# Patient Record
Sex: FEMALE | Race: WHITE | Employment: STUDENT | URBAN - NONMETROPOLITAN AREA
[De-identification: names, ages, dates, MRNs, and addresses within clinical notes are randomized per-mention and may not be internally consistent; named-entity substitution may affect disease eponyms.]

---
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
## 2024-02-14 ENCOUNTER — HOSPITAL ENCOUNTER (EMERGENCY)
Age: 19
Discharge: HOME | End: 2024-02-14
Payer: COMMERCIAL

## 2024-02-14 VITALS
WEIGHT: 156 LBS | RESPIRATION RATE: 17 BRPM | OXYGEN SATURATION: 100 % | BODY MASS INDEX: 23.72 KG/M2 | SYSTOLIC BLOOD PRESSURE: 132 MMHG | TEMPERATURE: 97.8 F | HEART RATE: 85 BPM | DIASTOLIC BLOOD PRESSURE: 85 MMHG
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
## 2024-02-14 PROCEDURE — 99282 EMERGENCY DEPT VISIT SF MDM: CPT
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
## 2024-02-15 NOTE — FLOWSHEET NOTE
Discharge instructions reviewed. Patient verbalized understanding. All questions answered., no additional needs at this time.     Name band;

## 2024-02-16 ENCOUNTER — OFFICE VISIT (OUTPATIENT)
Dept: FAMILY MEDICINE CLINIC | Age: 19
End: 2024-02-16

## 2024-02-16 VITALS
OXYGEN SATURATION: 100 % | SYSTOLIC BLOOD PRESSURE: 102 MMHG | WEIGHT: 151 LBS | HEART RATE: 97 BPM | TEMPERATURE: 98.1 F | BODY MASS INDEX: 22.88 KG/M2 | HEIGHT: 68 IN | RESPIRATION RATE: 16 BRPM | DIASTOLIC BLOOD PRESSURE: 64 MMHG
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
## 2024-02-16 RX ORDER — NORGESTIMATE AND ETHINYL ESTRADIOL 0.25-0.035
1 KIT ORAL DAILY
COMMUNITY
Start: 2024-01-20

## 2024-02-16 NOTE — PROGRESS NOTES
Routine blood work. She declind at this time.  -Encouraged her to let me know if she starts having any issues or if she just changes her mind.   -Denies any other complaints or questions      Counseled regarding above diagnosis, including possible risks and complications,  especially if left uncontrolled. Counseled regarding the possible side effects, risks, benefits and alternatives to treatment;patient and/or guardian verbalizes understanding, agrees, feels comfortable with and wishes to proceed with above treatment plan.    Call or go to ED immediately if symptoms worsen or persist. Advised patient to call with any new medication issues, and, as applicable, read all Rx info from pharmacy to assure aware of all possible risks and side effects of medicationbefore taking.     Patient and/or guardian given opportunity to ask questions/raise concerns.  The patient verbalized comfort and understanding ofinstructions.      Return to Office: No follow-ups on file.    Medication List:    Current Outpatient Medications   Medication Sig Dispense Refill    norgestimate-ethinyl estradiol (ORTHO-CYCLEN) 0.25-35 MG-MCG per tablet Take 1 tablet by mouth daily      amoxicillin-clavulanate (AUGMENTIN) 875-125 MG per tablet amoxicillin 875 mg-potassium clavulanate 125 mg tablet   take 1 tablet by mouth every 12 hours with meals for 10 days (Patient not taking: Reported on 2/16/2024)      predniSONE (DELTASONE) 10 MG tablet Take 4 tabs x 3 days, 3 tabs x 3 days, 2 tabs x 3 days, 1 tab x 3 days, stop. (Patient not taking: Reported on 11/29/2023) 30 tablet 0    azelastine (ASTELIN) 0.1 % nasal spray 1 spray by Nasal route 2 times daily Use in each nostril as directed (Patient not taking: Reported on 11/29/2023) 60 mL 1     No current facility-administered medications for this visit.        Sheldon Jurado MD     This document may have been prepared at least partially through the use of voice recognition software. Although effort is

## 2024-04-17 ENCOUNTER — OFFICE VISIT (OUTPATIENT)
Dept: FAMILY MEDICINE CLINIC | Age: 19
End: 2024-04-17

## 2024-04-17 VITALS
SYSTOLIC BLOOD PRESSURE: 118 MMHG | OXYGEN SATURATION: 100 % | WEIGHT: 152 LBS | TEMPERATURE: 97.7 F | RESPIRATION RATE: 18 BRPM | HEIGHT: 68 IN | DIASTOLIC BLOOD PRESSURE: 82 MMHG | HEART RATE: 74 BPM | BODY MASS INDEX: 23.04 KG/M2

## 2024-04-17 DIAGNOSIS — B96.89 ACUTE BACTERIAL SINUSITIS: Primary | ICD-10-CM

## 2024-04-17 DIAGNOSIS — J01.90 ACUTE BACTERIAL SINUSITIS: Primary | ICD-10-CM

## 2024-04-17 RX ORDER — DOXYCYCLINE HYCLATE 100 MG
100 TABLET ORAL 2 TIMES DAILY
Qty: 20 TABLET | Refills: 0 | Status: SHIPPED | OUTPATIENT
Start: 2024-04-17 | End: 2024-04-27

## 2024-04-17 RX ORDER — METHYLPREDNISOLONE ACETATE 40 MG/ML
40 INJECTION, SUSPENSION INTRA-ARTICULAR; INTRALESIONAL; INTRAMUSCULAR; SOFT TISSUE ONCE
Status: COMPLETED | OUTPATIENT
Start: 2024-04-17 | End: 2024-04-17

## 2024-04-17 RX ORDER — GUAIFENESIN 600 MG/1
600 TABLET, EXTENDED RELEASE ORAL 2 TIMES DAILY
Qty: 30 TABLET | Refills: 0 | Status: SHIPPED | OUTPATIENT
Start: 2024-04-17 | End: 2024-05-02

## 2024-04-17 RX ADMIN — METHYLPREDNISOLONE ACETATE 40 MG: 40 INJECTION, SUSPENSION INTRA-ARTICULAR; INTRALESIONAL; INTRAMUSCULAR; SOFT TISSUE at 12:18

## 2024-04-17 NOTE — PROGRESS NOTES
OFFICE NOTE    24  Name: Karlene Guidry  :2005   Sex:female   Age:18 y.o.      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 Likes it a lot. Was at concert over the weekend    Review of Systems   Symptoms as above. May be atopic. Not pregnant      Current Outpatient Medications:     doxycycline hyclate (VIBRA-TABS) 100 MG tablet, Take 1 tablet by mouth 2 times daily for 10 days, Disp: 20 tablet, Rfl: 0    guaiFENesin (MUCINEX) 600 MG extended release tablet, Take 1 tablet by mouth 2 times daily for 15 days, Disp: 30 tablet, Rfl: 0    norgestimate-ethinyl estradiol (ORTHO-CYCLEN) 0.25-35 MG-MCG per tablet, Take 1 tablet by mouth daily, Disp: , Rfl:     amoxicillin-clavulanate (AUGMENTIN) 875-125 MG per tablet, amoxicillin 875 mg-potassium clavulanate 125 mg tablet  take 1 tablet by mouth every 12 hours with meals for 10 days (Patient not taking: Reported on 2024), Disp: , Rfl:     predniSONE (DELTASONE) 10 MG tablet, Take 4 tabs x 3 days, 3 tabs x 3 days, 2 tabs x 3 days, 1 tab x 3 days, stop. (Patient not taking: Reported on 2023), Disp: 30 tablet, Rfl: 0    azelastine (ASTELIN) 0.1 % nasal spray, 1 spray by Nasal route 2 times daily Use in each nostril as directed (Patient not taking: Reported on 2023), Disp: 60 mL, Rfl: 1  No Known Allergies    No past medical history on file.  Past Surgical History:   Procedure Laterality Date    CYST REMOVAL  2017     Family History   Problem Relation Age of Onset    Heart Failure Maternal Grandfather      Social History       Tobacco History       Smoking Status  Never      Smokeless Tobacco Use  Never              Alcohol History       Alcohol Use Status  Never              Drug Use       Drug Use Status  Never              Sexual Activity       Sexually Active  Not

## 2024-11-18 ENCOUNTER — APPOINTMENT (OUTPATIENT)
Dept: CT IMAGING | Age: 19
End: 2024-11-18
Payer: COMMERCIAL

## 2024-11-18 ENCOUNTER — HOSPITAL ENCOUNTER (EMERGENCY)
Age: 19
Discharge: HOME | End: 2024-11-18
Attending: STUDENT IN AN ORGANIZED HEALTH CARE EDUCATION/TRAINING PROGRAM
Payer: COMMERCIAL

## 2024-11-18 VITALS
RESPIRATION RATE: 14 BRPM | BODY MASS INDEX: 23.54 KG/M2 | HEIGHT: 67 IN | HEART RATE: 75 BPM | TEMPERATURE: 98.2 F | OXYGEN SATURATION: 100 % | DIASTOLIC BLOOD PRESSURE: 77 MMHG | SYSTOLIC BLOOD PRESSURE: 111 MMHG | WEIGHT: 150 LBS

## 2024-11-18 DIAGNOSIS — R51.9 ACUTE NONINTRACTABLE HEADACHE, UNSPECIFIED HEADACHE TYPE: Primary | ICD-10-CM

## 2024-11-18 LAB
ALBUMIN SERPL-MCNC: 4.3 G/DL (ref 3.5–5.2)
ALP SERPL-CCNC: 71 U/L (ref 35–104)
ALT SERPL-CCNC: 11 U/L (ref 0–32)
ANION GAP SERPL CALCULATED.3IONS-SCNC: 11 MMOL/L (ref 7–16)
AST SERPL-CCNC: 15 U/L (ref 0–31)
BASOPHILS # BLD: 0.06 K/UL (ref 0–0.2)
BASOPHILS NFR BLD: 1 % (ref 0–2)
BILIRUB SERPL-MCNC: 0.2 MG/DL (ref 0–1.2)
BUN SERPL-MCNC: 10 MG/DL (ref 6–20)
CALCIUM SERPL-MCNC: 9 MG/DL (ref 8.6–10.2)
CHLORIDE SERPL-SCNC: 106 MMOL/L (ref 98–107)
CO2 SERPL-SCNC: 22 MMOL/L (ref 22–29)
CREAT SERPL-MCNC: 0.8 MG/DL (ref 0.5–1)
EOSINOPHIL # BLD: 0.15 K/UL (ref 0.05–0.5)
EOSINOPHILS RELATIVE PERCENT: 2 % (ref 0–6)
ERYTHROCYTE [DISTWIDTH] IN BLOOD BY AUTOMATED COUNT: 12.8 % (ref 11.5–15)
GFR, ESTIMATED: >90 ML/MIN/1.73M2
GLUCOSE SERPL-MCNC: 94 MG/DL (ref 74–99)
HCG, URINE, POC: NEGATIVE
HCT VFR BLD AUTO: 37.7 % (ref 34–48)
HGB BLD-MCNC: 12.8 G/DL (ref 11.5–15.5)
IMM GRANULOCYTES # BLD AUTO: <0.03 K/UL (ref 0–0.58)
IMM GRANULOCYTES NFR BLD: 0 % (ref 0–5)
LYMPHOCYTES NFR BLD: 3.77 K/UL (ref 1.5–4)
LYMPHOCYTES RELATIVE PERCENT: 47 % (ref 20–42)
Lab: NORMAL
MCH RBC QN AUTO: 30.2 PG (ref 26–35)
MCHC RBC AUTO-ENTMCNC: 34 G/DL (ref 32–34.5)
MCV RBC AUTO: 88.9 FL (ref 80–99.9)
MONOCYTES NFR BLD: 0.62 K/UL (ref 0.1–0.95)
MONOCYTES NFR BLD: 8 % (ref 2–12)
NEGATIVE QC PASS/FAIL: NORMAL
NEUTROPHILS NFR BLD: 42 % (ref 43–80)
NEUTS SEG NFR BLD: 3.34 K/UL (ref 1.8–7.3)
PLATELET # BLD AUTO: 243 K/UL (ref 130–450)
PMV BLD AUTO: 10.2 FL (ref 7–12)
POSITIVE QC PASS/FAIL: NORMAL
POTASSIUM SERPL-SCNC: 4.1 MMOL/L (ref 3.5–5)
PROT SERPL-MCNC: 6.9 G/DL (ref 6.4–8.3)
RBC # BLD AUTO: 4.24 M/UL (ref 3.5–5.5)
SARS-COV-2 RDRP RESP QL NAA+PROBE: NOT DETECTED
SODIUM SERPL-SCNC: 139 MMOL/L (ref 132–146)
SPECIMEN DESCRIPTION: NORMAL
WBC OTHER # BLD: 8 K/UL (ref 4.5–11.5)

## 2024-11-18 PROCEDURE — 96374 THER/PROPH/DIAG INJ IV PUSH: CPT

## 2024-11-18 PROCEDURE — 70496 CT ANGIOGRAPHY HEAD: CPT

## 2024-11-18 PROCEDURE — 96375 TX/PRO/DX INJ NEW DRUG ADDON: CPT

## 2024-11-18 PROCEDURE — 6360000002 HC RX W HCPCS

## 2024-11-18 PROCEDURE — 6360000004 HC RX CONTRAST MEDICATION: Performed by: RADIOLOGY

## 2024-11-18 PROCEDURE — 70450 CT HEAD/BRAIN W/O DYE: CPT

## 2024-11-18 PROCEDURE — 80053 COMPREHEN METABOLIC PANEL: CPT

## 2024-11-18 PROCEDURE — 99285 EMERGENCY DEPT VISIT HI MDM: CPT

## 2024-11-18 PROCEDURE — 85025 COMPLETE CBC W/AUTO DIFF WBC: CPT

## 2024-11-18 PROCEDURE — 6360000002 HC RX W HCPCS: Performed by: STUDENT IN AN ORGANIZED HEALTH CARE EDUCATION/TRAINING PROGRAM

## 2024-11-18 PROCEDURE — 87635 SARS-COV-2 COVID-19 AMP PRB: CPT

## 2024-11-18 RX ORDER — KETOROLAC TROMETHAMINE 15 MG/ML
15 INJECTION, SOLUTION INTRAMUSCULAR; INTRAVENOUS ONCE
Status: COMPLETED | OUTPATIENT
Start: 2024-11-18 | End: 2024-11-18

## 2024-11-18 RX ORDER — PROCHLORPERAZINE EDISYLATE 5 MG/ML
10 INJECTION INTRAMUSCULAR; INTRAVENOUS ONCE
Status: COMPLETED | OUTPATIENT
Start: 2024-11-18 | End: 2024-11-18

## 2024-11-18 RX ORDER — DIPHENHYDRAMINE HYDROCHLORIDE 50 MG/ML
25 INJECTION INTRAMUSCULAR; INTRAVENOUS ONCE
Status: COMPLETED | OUTPATIENT
Start: 2024-11-18 | End: 2024-11-18

## 2024-11-18 RX ORDER — IOPAMIDOL 755 MG/ML
75 INJECTION, SOLUTION INTRAVASCULAR
Status: COMPLETED | OUTPATIENT
Start: 2024-11-18 | End: 2024-11-18

## 2024-11-18 RX ADMIN — KETOROLAC TROMETHAMINE 15 MG: 15 INJECTION, SOLUTION INTRAMUSCULAR; INTRAVENOUS at 07:13

## 2024-11-18 RX ADMIN — PROCHLORPERAZINE EDISYLATE 10 MG: 5 INJECTION INTRAMUSCULAR; INTRAVENOUS at 03:50

## 2024-11-18 RX ADMIN — DIPHENHYDRAMINE HYDROCHLORIDE 25 MG: 50 INJECTION INTRAMUSCULAR; INTRAVENOUS at 03:21

## 2024-11-18 RX ADMIN — IOPAMIDOL 75 ML: 755 INJECTION, SOLUTION INTRAVENOUS at 05:59

## 2024-11-18 ASSESSMENT — ENCOUNTER SYMPTOMS
VOICE CHANGE: 0
TROUBLE SWALLOWING: 0
EYE ITCHING: 0
SINUS PAIN: 0
BACK PAIN: 0
EYE DISCHARGE: 0
COLOR CHANGE: 0
SHORTNESS OF BREATH: 0
COUGH: 0
ABDOMINAL PAIN: 0
CHEST TIGHTNESS: 0
VOMITING: 0
EYE PAIN: 0
NAUSEA: 0

## 2024-11-18 ASSESSMENT — PAIN DESCRIPTION - LOCATION: LOCATION: HEAD

## 2024-11-18 ASSESSMENT — PAIN SCALES - GENERAL: PAINLEVEL_OUTOF10: 2

## 2024-11-18 NOTE — ED PROVIDER NOTES
Oropharynx is clear.   Eyes:      Extraocular Movements: Extraocular movements intact.      Pupils: Pupils are equal, round, and reactive to light.   Cardiovascular:      Rate and Rhythm: Normal rate and regular rhythm.      Pulses: Normal pulses.      Heart sounds: Normal heart sounds.   Pulmonary:      Effort: Pulmonary effort is normal.      Breath sounds: Normal breath sounds.   Abdominal:      General: Abdomen is flat.      Palpations: Abdomen is soft.      Tenderness: There is no abdominal tenderness.   Musculoskeletal:         General: Normal range of motion.      Cervical back: Normal range of motion and neck supple.   Skin:     General: Skin is warm and dry.      Capillary Refill: Capillary refill takes less than 2 seconds.   Neurological:      General: No focal deficit present.      Mental Status: She is alert and oriented to person, place, and time.                  Procedures    MDM  Number of Diagnoses or Management Options  Acute nonintractable headache, unspecified headache type  Diagnosis management comments: 19-year-old female evaluated for headache that started since this evening.  No loss of consciousness, no slurring of speech or weakness of extremities.  Headache has been gradual and on and off.  On arrival afebrile and hemodynamically stable.  Neurologically intact.  Not in distress.  No visual symptoms.  Differential diagnosis would include primary headache, migraine headache, subarachnoid hemorrhage, electrolyte abnormalities, dehydration.  CT head and CTA head without any findings concerning for bleed.  No leukocytosis, hemoglobin stable.  Normal electrolytes renal function.  Patient was given Compazine and Benadryl.  Additionally Toradol was given.  On reassessment patient had significant relief of her symptoms.  After medications, patient ambulated independently without any distress.  Findings most likely migraine versus other primary headache.  Specific return instructions were provided,

## 2024-11-18 NOTE — ED NOTES
Pt ambulated okay, very sleepy but states she feels okay. Headache still present. Pt asking for work and school excuse.

## 2024-11-18 NOTE — DISCHARGE INSTRUCTIONS
CTA HEAD W CONTRAST   Final Result   Unremarkable CTA of the head.         CT Head W/O Contrast   Final Result   No acute intracranial abnormality.

## 2024-11-20 ENCOUNTER — OFFICE VISIT (OUTPATIENT)
Dept: FAMILY MEDICINE CLINIC | Age: 19
End: 2024-11-20
Payer: COMMERCIAL

## 2024-11-20 VITALS
HEART RATE: 84 BPM | DIASTOLIC BLOOD PRESSURE: 72 MMHG | SYSTOLIC BLOOD PRESSURE: 112 MMHG | BODY MASS INDEX: 26.31 KG/M2 | OXYGEN SATURATION: 100 % | TEMPERATURE: 98 F | WEIGHT: 168 LBS

## 2024-11-20 DIAGNOSIS — R59.1 LYMPHADENOPATHY: ICD-10-CM

## 2024-11-20 DIAGNOSIS — R59.1 LYMPHADENOPATHY: Primary | ICD-10-CM

## 2024-11-20 LAB
BASOPHILS ABSOLUTE: 0.06 K/UL (ref 0–0.2)
BASOPHILS RELATIVE PERCENT: 1 % (ref 0–2)
EOSINOPHILS ABSOLUTE: 0.08 K/UL (ref 0.05–0.5)
EOSINOPHILS RELATIVE PERCENT: 1 % (ref 0–6)
HCT VFR BLD CALC: 38.6 % (ref 34–48)
HEMOGLOBIN: 12.7 G/DL (ref 11.5–15.5)
IMMATURE GRANULOCYTES %: 0 % (ref 0–5)
IMMATURE GRANULOCYTES ABSOLUTE: <0.03 K/UL (ref 0–0.58)
LACTATE DEHYDROGENASE: 175 U/L (ref 135–214)
LYMPHOCYTES ABSOLUTE: 2.12 K/UL (ref 1.5–4)
LYMPHOCYTES RELATIVE PERCENT: 35 % (ref 20–42)
MCH RBC QN AUTO: 30.2 PG (ref 26–35)
MCHC RBC AUTO-ENTMCNC: 32.9 G/DL (ref 32–34.5)
MCV RBC AUTO: 91.9 FL (ref 80–99.9)
MONOCYTES ABSOLUTE: 0.44 K/UL (ref 0.1–0.95)
MONOCYTES RELATIVE PERCENT: 7 % (ref 2–12)
NEUTROPHILS ABSOLUTE: 3.4 K/UL (ref 1.8–7.3)
NEUTROPHILS RELATIVE PERCENT: 56 % (ref 43–80)
PDW BLD-RTO: 12.9 % (ref 11.5–15)
PLATELET # BLD: 240 K/UL (ref 130–450)
PMV BLD AUTO: 10.8 FL (ref 7–12)
RBC # BLD: 4.2 M/UL (ref 3.5–5.5)
WBC # BLD: 6.1 K/UL (ref 4.5–11.5)

## 2024-11-20 PROCEDURE — 99214 OFFICE O/P EST MOD 30 MIN: CPT | Performed by: STUDENT IN AN ORGANIZED HEALTH CARE EDUCATION/TRAINING PROGRAM

## 2024-11-20 RX ORDER — LORATADINE 10 MG/1
10 TABLET ORAL DAILY
Qty: 30 TABLET | Refills: 0 | Status: SHIPPED | OUTPATIENT
Start: 2024-11-20

## 2024-11-20 ASSESSMENT — PATIENT HEALTH QUESTIONNAIRE - PHQ9
SUM OF ALL RESPONSES TO PHQ QUESTIONS 1-9: 0
2. FEELING DOWN, DEPRESSED OR HOPELESS: NOT AT ALL

## 2024-11-20 NOTE — PROGRESS NOTES
[facility]  Office Progress Note  Dr. Sheldon Jurado      Patient:  Karlene Guidry 19 y.o. female     Date of Service: 11/20/24      Chief complaint:   Chief Complaint   Patient presents with    Follow-up     Back left of head was swollen and very painful, went to ER Sunday night    Ear Pain     Ears feel dry and itchy. Have been bothering her for awhile         History of Present Illness   The patient is a 19 y.o. female  here to follow up of their headache     Back side of left head was swollen-worsened throughout the day along with a headache with some minor nausea and photophobia. She is on OCP. She was treated with compazine, benadryl, toradol and symptoms have been slowly improving since then and today she is virtually asymptomatic. Feels like the swelling at the back of her head has resolved as well    She had a CT and CTA, both of which were unremarkable  IMPRESSION:  Unremarkable CTA of the head.    Her ears have been itching recently       Past Medical History:  No past medical history on file.    Review of Systems:   Review of Systems   Constitutional:  Negative for chills and fever.   HENT:  Negative for congestion and rhinorrhea.    Respiratory:  Negative for cough and shortness of breath.    Cardiovascular:  Negative for chest pain and leg swelling.   Gastrointestinal:  Negative for abdominal pain, nausea and vomiting.   Genitourinary:  Negative for dysuria and hematuria.   Musculoskeletal:  Negative for arthralgias and myalgias.   Skin:  Negative for rash and wound.   Neurological:  Negative for dizziness and light-headedness.       Physical Exam   Vitals: /72   Pulse 84   Temp 98 °F (36.7 °C)   Wt 76.2 kg (168 lb)   SpO2 100%   BMI 26.31 kg/m²   Physical Exam  Lymphadenopathy:      Head:      Right side of head: No submental, submandibular, tonsillar, preauricular, posterior auricular or occipital adenopathy.      Left side of head: No submental, submandibular, tonsillar,

## 2024-11-21 LAB — MONONUCLEOSIS SCREEN: NEGATIVE

## 2024-11-21 ASSESSMENT — ENCOUNTER SYMPTOMS
ABDOMINAL PAIN: 0
NAUSEA: 0
VOMITING: 0
SHORTNESS OF BREATH: 0
COUGH: 0
RHINORRHEA: 0

## 2025-02-10 ENCOUNTER — OFFICE VISIT (OUTPATIENT)
Dept: FAMILY MEDICINE CLINIC | Age: 20
End: 2025-02-10

## 2025-02-10 VITALS
BODY MASS INDEX: 27 KG/M2 | WEIGHT: 172 LBS | TEMPERATURE: 99.1 F | SYSTOLIC BLOOD PRESSURE: 114 MMHG | HEIGHT: 67 IN | OXYGEN SATURATION: 97 % | DIASTOLIC BLOOD PRESSURE: 78 MMHG | HEART RATE: 112 BPM

## 2025-02-10 DIAGNOSIS — R68.83 CHILLS: Primary | ICD-10-CM

## 2025-02-10 DIAGNOSIS — J10.1 INFLUENZA A: ICD-10-CM

## 2025-02-10 LAB
INFLUENZA A ANTIBODY: NORMAL
INFLUENZA B ANTIBODY: NORMAL

## 2025-02-10 NOTE — PROGRESS NOTES
[facility]    Karlene JG Guidry presents to the office today for   Chief Complaint   Patient presents with    Chills     Weakness, fever, headache       History of Present Illness  The patient presents for evaluation of influenza.    She began experiencing symptoms indicative of influenza on Friday night, following her return from work. These symptoms include generalized body aches, weakness, chills, profuse sweating, and a productive cough. She is uncertain about potential exposure to the virus. She reports no episodes of vomiting.    SOCIAL HISTORY  She works as a .    Review of Systems     /78   Pulse (!) 112   Temp 99.1 °F (37.3 °C) (Temporal)   Ht 1.702 m (5' 7\")   Wt 78 kg (172 lb)   LMP 02/08/2025 (Exact Date)   SpO2 97%   BMI 26.94 kg/m²   Physical Exam  Constitutional:       Appearance: Normal appearance.   HENT:      Head: Normocephalic and atraumatic.      Nose: Rhinorrhea present.      Mouth/Throat:      Mouth: Mucous membranes are moist.      Pharynx: Posterior oropharyngeal erythema present.   Eyes:      Extraocular Movements: Extraocular movements intact.      Conjunctiva/sclera: Conjunctivae normal.   Cardiovascular:      Rate and Rhythm: Normal rate.      Heart sounds: Normal heart sounds.   Pulmonary:      Effort: Pulmonary effort is normal.      Breath sounds: Normal breath sounds.   Skin:     General: Skin is warm.   Neurological:      Mental Status: She is alert and oriented to person, place, and time.   Psychiatric:         Mood and Affect: Mood normal.         Behavior: Behavior normal.            Current Outpatient Medications:     loratadine (CLARITIN) 10 MG tablet, Take 1 tablet by mouth daily, Disp: 30 tablet, Rfl: 0    norgestimate-ethinyl estradiol (ORTHO-CYCLEN) 0.25-35 MG-MCG per tablet, Take 1 tablet by mouth daily (Patient not taking: Reported on 2/10/2025), Disp: , Rfl:      No past medical history on file.    Assessment & Plan  1. Influenza A.  She reports